# Patient Record
Sex: FEMALE | Race: WHITE | NOT HISPANIC OR LATINO
[De-identification: names, ages, dates, MRNs, and addresses within clinical notes are randomized per-mention and may not be internally consistent; named-entity substitution may affect disease eponyms.]

---

## 2021-04-15 PROBLEM — Z00.00 ENCOUNTER FOR PREVENTIVE HEALTH EXAMINATION: Status: ACTIVE | Noted: 2021-04-15

## 2021-04-19 ENCOUNTER — APPOINTMENT (OUTPATIENT)
Dept: OTOLARYNGOLOGY | Facility: CLINIC | Age: 41
End: 2021-04-19
Payer: SELF-PAY

## 2021-04-19 PROCEDURE — 92593: CPT | Mod: NC

## 2023-03-29 ENCOUNTER — APPOINTMENT (OUTPATIENT)
Dept: OTOLARYNGOLOGY | Facility: CLINIC | Age: 43
End: 2023-03-29
Payer: COMMERCIAL

## 2023-03-29 ENCOUNTER — TRANSCRIPTION ENCOUNTER (OUTPATIENT)
Age: 43
End: 2023-03-29

## 2023-03-29 VITALS — BODY MASS INDEX: 17.74 KG/M2 | HEIGHT: 67 IN | WEIGHT: 113 LBS

## 2023-03-29 DIAGNOSIS — Z82.49 FAMILY HISTORY OF ISCHEMIC HEART DISEASE AND OTHER DISEASES OF THE CIRCULATORY SYSTEM: ICD-10-CM

## 2023-03-29 DIAGNOSIS — Z78.9 OTHER SPECIFIED HEALTH STATUS: ICD-10-CM

## 2023-03-29 DIAGNOSIS — Z87.01 PERSONAL HISTORY OF PNEUMONIA (RECURRENT): ICD-10-CM

## 2023-03-29 DIAGNOSIS — Z86.718 PERSONAL HISTORY OF OTHER VENOUS THROMBOSIS AND EMBOLISM: ICD-10-CM

## 2023-03-29 DIAGNOSIS — Z86.79 PERSONAL HISTORY OF OTHER DISEASES OF THE CIRCULATORY SYSTEM: ICD-10-CM

## 2023-03-29 DIAGNOSIS — Z87.39 PERSONAL HISTORY OF OTHER DISEASES OF THE MUSCULOSKELETAL SYSTEM AND CONNECTIVE TISSUE: ICD-10-CM

## 2023-03-29 DIAGNOSIS — Z86.69 PERSONAL HISTORY OF OTHER DISEASES OF THE NERVOUS SYSTEM AND SENSE ORGANS: ICD-10-CM

## 2023-03-29 DIAGNOSIS — Z86.39 PERSONAL HISTORY OF OTHER ENDOCRINE, NUTRITIONAL AND METABOLIC DISEASE: ICD-10-CM

## 2023-03-29 DIAGNOSIS — Z94.9 TRANSPLANTED ORGAN AND TISSUE STATUS, UNSPECIFIED: ICD-10-CM

## 2023-03-29 DIAGNOSIS — Z87.448 PERSONAL HISTORY OF OTHER DISEASES OF URINARY SYSTEM: ICD-10-CM

## 2023-03-29 DIAGNOSIS — Z80.9 FAMILY HISTORY OF MALIGNANT NEOPLASM, UNSPECIFIED: ICD-10-CM

## 2023-03-29 PROCEDURE — 99214 OFFICE O/P EST MOD 30 MIN: CPT

## 2023-03-29 RX ORDER — VENLAFAXINE 37.5 MG/1
TABLET ORAL
Refills: 0 | Status: ACTIVE | COMMUNITY

## 2023-03-29 RX ORDER — PREDNISONE 50 MG/1
TABLET ORAL
Refills: 0 | Status: ACTIVE | COMMUNITY

## 2023-03-29 RX ORDER — BUPROPION HYDROCHLORIDE 75 MG/1
TABLET, FILM COATED ORAL
Refills: 0 | Status: ACTIVE | COMMUNITY

## 2023-03-29 RX ORDER — METHYLPHENIDATE HYDROCHLORIDE 5 MG/1
TABLET ORAL
Refills: 0 | Status: ACTIVE | COMMUNITY

## 2023-03-29 RX ORDER — CIPROFLOXACIN AND DEXAMETHASONE 3; 1 MG/ML; MG/ML
0.3-0.1 SUSPENSION/ DROPS AURICULAR (OTIC) TWICE DAILY
Qty: 1 | Refills: 1 | Status: ACTIVE | COMMUNITY
Start: 2023-03-29 | End: 1900-01-01

## 2023-03-29 RX ORDER — NIFEDIPINE 20 MG/1
CAPSULE ORAL
Refills: 0 | Status: ACTIVE | COMMUNITY

## 2023-03-29 RX ORDER — TACROLIMUS 1 MG/1
1 GRANULE, FOR SUSPENSION ORAL
Refills: 0 | Status: ACTIVE | COMMUNITY

## 2023-03-29 NOTE — ASSESSMENT
[FreeTextEntry1] : She has right otitis externa.  There is no obvious acute otitis media.  She is a type As tympanogram.  She has a known left sensorineural hearing loss for many years.\par \par Plan\par -Findings and management options were discussed with the patient.\par -Good aural hygiene reviewed. avoid cleaning the ears with cotton swabs.\par -dry ear precautions\par -the patient will be placed on antibiotic ear drops for one week.\par -Audiogram when she returns\par -follow up in in 1 week\par -call and return earlier if any concerns

## 2023-04-06 ENCOUNTER — APPOINTMENT (OUTPATIENT)
Dept: OTOLARYNGOLOGY | Facility: CLINIC | Age: 43
End: 2023-04-06
Payer: COMMERCIAL

## 2023-04-06 ENCOUNTER — APPOINTMENT (OUTPATIENT)
Dept: OTOLARYNGOLOGY | Facility: CLINIC | Age: 43
End: 2023-04-06
Payer: SELF-PAY

## 2023-04-06 VITALS — BODY MASS INDEX: 17.74 KG/M2 | HEIGHT: 67 IN | WEIGHT: 113 LBS

## 2023-04-06 DIAGNOSIS — Z46.1 ENCOUNTER FOR FITTING AND ADJUSTMENT OF HEARING AID: ICD-10-CM

## 2023-04-06 DIAGNOSIS — H90.5 UNSPECIFIED SENSORINEURAL HEARING LOSS: ICD-10-CM

## 2023-04-06 DIAGNOSIS — H90.A22 SENSORINEURAL HEARING LOSS, UNILATERAL, LEFT EAR, WITH RESTRICTED HEARING ON THE CONTRALATERAL SIDE: ICD-10-CM

## 2023-04-06 PROCEDURE — 69420 INCISION OF EARDRUM: CPT

## 2023-04-06 PROCEDURE — 92567 TYMPANOMETRY: CPT

## 2023-04-06 PROCEDURE — 92557 COMPREHENSIVE HEARING TEST: CPT

## 2023-04-06 PROCEDURE — 99213 OFFICE O/P EST LOW 20 MIN: CPT | Mod: 25

## 2023-04-06 PROCEDURE — 92593: CPT | Mod: NC

## 2023-04-06 NOTE — HISTORY OF PRESENT ILLNESS
[de-identified] : NORM PENNINGTON is a 42 year old patient here for follow-up for right eustachian tube dysfunction and otitis otitis externa.  She was on the eardrops.  She has no pain or drainage but still has hearing loss and ear fullness.  It is very bothersome.  \par \par ENT history\par She had a left sensorineural hearing loss following an infection approximate 20 years ago.\par She said she had an MRI at the time.  \par She uses a Cros hearing aid\par No history recurrent renal infections, prior otologic surgery, or excessive noise exposure

## 2023-04-06 NOTE — ASSESSMENT
[FreeTextEntry1] : The right otitis externa has resolved.  She was found to have right eustachian dysfunction with middle ear effusion and mixed hearing loss.  We discussed in detail her options including observation, oral steroids, and myringotomy.  She wished to proceed with myringotomy.  She tolerated it well.  There was a large effusion\par \par Plan\par -Findings and management options were discussed with the patient.\par -Continue good ear hygiene\par -dry ear precautions.  She was given earplugs\par -She may use the antibiotic eardrops for approximately 3 days\par -She may use a nasal steroid spray and/or decongestant\par -We will repeat her audiogram when she returns\par -I spoke with her performing nasal endoscopy.  I will speak with her about this again when she returns\par -Follow-up in 2 weeks\par -call and return earlier if any concerns

## 2023-04-10 PROBLEM — Z46.1 FITTING AND ADJUSTMENT OF HEARING AID: Status: ACTIVE | Noted: 2021-04-19

## 2023-04-27 ENCOUNTER — APPOINTMENT (OUTPATIENT)
Dept: OTOLARYNGOLOGY | Facility: CLINIC | Age: 43
End: 2023-04-27
Payer: COMMERCIAL

## 2023-04-27 DIAGNOSIS — H90.3 SENSORINEURAL HEARING LOSS, BILATERAL: ICD-10-CM

## 2023-04-27 DIAGNOSIS — H69.81 OTHER SPECIFIED DISORDERS OF EUSTACHIAN TUBE, RIGHT EAR: ICD-10-CM

## 2023-04-27 DIAGNOSIS — H60.391 OTHER INFECTIVE OTITIS EXTERNA, RIGHT EAR: ICD-10-CM

## 2023-04-27 DIAGNOSIS — H90.A31 MIXED CONDUCTIVE AND SENSORINEURAL HEARING LOSS, UNILATERAL, RIGHT EAR WITH RESTRICTED HEARING ON THE  CONTRALATERAL SIDE: ICD-10-CM

## 2023-04-27 DIAGNOSIS — H65.00 ACUTE SEROUS OTITIS MEDIA, UNSPECIFIED EAR: ICD-10-CM

## 2023-04-27 DIAGNOSIS — H65.01 ACUTE SEROUS OTITIS MEDIA, RIGHT EAR: ICD-10-CM

## 2023-04-27 PROCEDURE — 31231 NASAL ENDOSCOPY DX: CPT | Mod: 52,RT

## 2023-04-27 PROCEDURE — 99213 OFFICE O/P EST LOW 20 MIN: CPT | Mod: 25

## 2023-04-27 PROCEDURE — 92557 COMPREHENSIVE HEARING TEST: CPT

## 2023-04-27 PROCEDURE — 92567 TYMPANOMETRY: CPT

## 2023-04-27 NOTE — HISTORY OF PRESENT ILLNESS
[de-identified] : NORM PENNINGTON is a 42 year old patient here for follow-up for otitis externa and right eustachian tube dysfunction.  She underwent right myringotomy at her last visit.  She is feeling a lot better.  Her hearing has improved.  She has no pain or drainage.  She had a little bit of itching in the left ear and used the drops.  That has improved.  She has no nasal or sinus symptoms.  She is being scheduled for a kidney transplant.\par \par ENT history\par She had a left sensorineural hearing loss following an infection approximate 20 years ago.\par She said she had an MRI at the time. \par She uses a Cros hearing aid\par No history recurrent renal infections, prior otologic surgery, or excessive noise exposure

## 2023-04-27 NOTE — ASSESSMENT
[FreeTextEntry1] : The right middle ear effusion has resolved.  Her ears were normal on exam.  Audiogram showed improvement in the hearing in her right ear.  Nasal endoscopy was unremarkable.\par \par Plan\par -Findings and management options were discussed with the patient.\par -Good ear hygiene\par -Nasal steroid spray and/or decongestant as needed for nasal congestion for eustachian tube dysfunction\par -Annual audiogram\par -She will see the audiologist to have her hearing aids checked\par -I will see her back next year if she is doing well.  She should return earlier if she has any concerns\par -There is no evidence of ear or sinus infection on exam today that would interfere with her upcoming transplant

## 2023-04-27 NOTE — CONSULT LETTER
[Dear  ___] : Dear  [unfilled], [Consult Letter:] : I had the pleasure of evaluating your patient, [unfilled]. [Please see my note below.] : Please see my note below. [Consult Closing:] : Thank you very much for allowing me to participate in the care of this patient.  If you have any questions, please do not hesitate to contact me. [Sincerely,] : Sincerely, [FreeTextEntry3] : Ruma Santo MD\par [facility]\par Otolaryngology – Head & Neck Surgery\par [facility]\par \par \par Department of Otolaryngology\par [facility]\par \par Office Tel: [phone]\par

## 2023-04-28 PROBLEM — H90.3 SENSORINEURAL HEARING LOSS (SNHL) OF BOTH EARS: Status: ACTIVE | Noted: 2023-04-27

## 2023-05-08 ENCOUNTER — APPOINTMENT (OUTPATIENT)
Dept: OTOLARYNGOLOGY | Facility: CLINIC | Age: 43
End: 2023-05-08

## 2024-08-26 ENCOUNTER — APPOINTMENT (OUTPATIENT)
Dept: OTOLARYNGOLOGY | Facility: CLINIC | Age: 44
End: 2024-08-26
Payer: COMMERCIAL

## 2024-08-26 ENCOUNTER — NON-APPOINTMENT (OUTPATIENT)
Age: 44
End: 2024-08-26

## 2024-08-26 DIAGNOSIS — H90.3 SENSORINEURAL HEARING LOSS, BILATERAL: ICD-10-CM

## 2024-08-26 PROCEDURE — 92557 COMPREHENSIVE HEARING TEST: CPT

## 2024-08-26 PROCEDURE — 99213 OFFICE O/P EST LOW 20 MIN: CPT

## 2024-08-26 PROCEDURE — 92567 TYMPANOMETRY: CPT

## 2024-08-26 NOTE — HISTORY OF PRESENT ILLNESS
[de-identified] : NORM PENNINGTON is a 43 year old patient  with a history of left SNHL following an infection many years ago. She uses a Cros or BiCros hearing aid as needed.  She had left otitis externa recently. she used ear drops. the pain improved. she has no otalgia, otorrhea, tinnitus or dizziness. no nasal or throat symptoms  ENT history She had a left sensorineural hearing loss following an infection approximately 20 years ago.  She said she had an MRI at the time.  She uses a Cros or BiCros hearing aid  No history recurrent renal infections, prior otologic surgery, or excessive noise exposure.  her father has hearing loss which is likely due to noise exposure Right myringotomy 4/2023 for a middle ear effusion NE- no masses

## 2024-08-26 NOTE — PHYSICAL EXAM
[TextEntry] : PHYSICAL EXAM   General: The patient was alert, oriented and in no distress. Voice was clear.   Face: The patient had no facial asymmetry or mass. The skin was unremarkable.   Ears: Hearing normal to conversational voice External ears were normal without deformity. Ear canals were clear. No cerumen or inflammation Tympanic membranes were intact and normal. No perforation or effusion. mobile   Nose: The external nose had no significant deformity.   On anterior rhinoscopy, the nasal mucosa was normal.  The anterior septum was grossly midline.     Oral cavity: Oral mucosa- normal. Oral and base of tongue- clear and without mass. Gingival and buccal mucosa- moist and without lesions. Palate- the palate moved well. There was no cleft palate. There appeared to be good salivary flow.  Oral cavity/oropharynx- no pus, erythema or mass   Neck: The neck was symmetrical. The parotid and submandibular glands were normal without masses. The trachea was midline and there was no unusual crepitus. Thyroid was smooth and nontender and no masses were palpated. No masses   Lymphatics: Cervical adenopathy- none.